# Patient Record
Sex: FEMALE | NOT HISPANIC OR LATINO | URBAN - METROPOLITAN AREA
[De-identification: names, ages, dates, MRNs, and addresses within clinical notes are randomized per-mention and may not be internally consistent; named-entity substitution may affect disease eponyms.]

---

## 2020-01-07 ENCOUNTER — DOCUMENTATION (OUTPATIENT)
Dept: AUDIOLOGY | Age: 25
End: 2020-01-07

## 2020-01-07 NOTE — PROGRESS NOTES
Progress Note    Name:  Ernesto March  :  1995  Age:  25 y  o  Date of Evaluation: 20     Scanned in HA chart         RefugTory Delgado , CCC-A  Clinical Audiologist

## 2022-09-30 ENCOUNTER — APPOINTMENT (OUTPATIENT)
Dept: URGENT CARE | Facility: MEDICAL CENTER | Age: 27
End: 2022-09-30
Payer: OTHER MISCELLANEOUS

## 2022-09-30 PROCEDURE — 99283 EMERGENCY DEPT VISIT LOW MDM: CPT | Performed by: PHYSICIAN ASSISTANT

## 2022-09-30 PROCEDURE — G0382 LEV 3 HOSP TYPE B ED VISIT: HCPCS | Performed by: PHYSICIAN ASSISTANT

## 2023-10-04 ENCOUNTER — OFFICE VISIT (OUTPATIENT)
Dept: AUDIOLOGY | Age: 28
End: 2023-10-04
Payer: COMMERCIAL

## 2023-10-04 DIAGNOSIS — H90.3 SENSORY HEARING LOSS, BILATERAL: Primary | ICD-10-CM

## 2023-10-04 NOTE — PROGRESS NOTES
Progress Note    Name:  Hellen Azevedo  :  1995  Age:  29 y.o. Date of Evaluation: 10/04/23     [facility] Right serial number [id number]. Left serial number [id number]. Warranty date: 2023 (Loss/Damage and repair). Patient arrived for her husbands hearing aid evaluation and requested her hearing aids, that were purchased in [place], be retubed. She noted a 5 year warranty. She was quoted $30.00 since she did not purchase her hearing aids here. Patient also noted feedback. Hearing aids were connected and feedback test was run. Patient voiced satisfaction. [employer] was contacted and they explained that they were purchased through [facility] and that the warranty is up on 2025    Macario Hagen.   Clinical Audiologist